# Patient Record
Sex: FEMALE | Race: WHITE | URBAN - METROPOLITAN AREA
[De-identification: names, ages, dates, MRNs, and addresses within clinical notes are randomized per-mention and may not be internally consistent; named-entity substitution may affect disease eponyms.]

---

## 2017-10-04 ENCOUNTER — OFFICE VISIT (OUTPATIENT)
Dept: FAMILY MEDICINE CLINIC | Age: 42
End: 2017-10-04

## 2017-10-04 VITALS
WEIGHT: 173.6 LBS | HEIGHT: 64 IN | HEART RATE: 97 BPM | SYSTOLIC BLOOD PRESSURE: 122 MMHG | RESPIRATION RATE: 14 BRPM | DIASTOLIC BLOOD PRESSURE: 78 MMHG | OXYGEN SATURATION: 97 % | BODY MASS INDEX: 29.64 KG/M2

## 2017-10-04 DIAGNOSIS — Z00.00 ANNUAL PHYSICAL EXAM: Primary | ICD-10-CM

## 2017-10-04 DIAGNOSIS — B00.9 HSV-2 INFECTION: ICD-10-CM

## 2017-10-04 DIAGNOSIS — H53.2 DIPLOPIA: ICD-10-CM

## 2017-10-04 DIAGNOSIS — Z00.00 ANNUAL PHYSICAL EXAM: ICD-10-CM

## 2017-10-04 DIAGNOSIS — H49.11 FOURTH NERVE PALSY OF RIGHT EYE: ICD-10-CM

## 2017-10-04 DIAGNOSIS — I49.8 SINUS ARRHYTHMIA: ICD-10-CM

## 2017-10-04 LAB
A/G RATIO: 1.4 (ref 1.1–2.2)
ALBUMIN SERPL-MCNC: 3.9 G/DL (ref 3.4–5)
ALP BLD-CCNC: 68 U/L (ref 40–129)
ALT SERPL-CCNC: 17 U/L (ref 10–40)
ANION GAP SERPL CALCULATED.3IONS-SCNC: 13 MMOL/L (ref 3–16)
AST SERPL-CCNC: 19 U/L (ref 15–37)
BASOPHILS ABSOLUTE: 0 K/UL (ref 0–0.2)
BASOPHILS RELATIVE PERCENT: 0.2 %
BILIRUB SERPL-MCNC: 0.6 MG/DL (ref 0–1)
BUN BLDV-MCNC: 11 MG/DL (ref 7–20)
CALCIUM SERPL-MCNC: 8.9 MG/DL (ref 8.3–10.6)
CHLORIDE BLD-SCNC: 104 MMOL/L (ref 99–110)
CHOLESTEROL, TOTAL: 207 MG/DL (ref 0–199)
CO2: 24 MMOL/L (ref 21–32)
CREAT SERPL-MCNC: 0.7 MG/DL (ref 0.6–1.1)
EOSINOPHILS ABSOLUTE: 0 K/UL (ref 0–0.6)
EOSINOPHILS RELATIVE PERCENT: 0.7 %
GFR AFRICAN AMERICAN: >60
GFR NON-AFRICAN AMERICAN: >60
GLOBULIN: 2.7 G/DL
GLUCOSE BLD-MCNC: 98 MG/DL (ref 70–99)
HCT VFR BLD CALC: 42.4 % (ref 36–48)
HDLC SERPL-MCNC: 78 MG/DL (ref 40–60)
HEMOGLOBIN: 14.1 G/DL (ref 12–16)
LDL CHOLESTEROL CALCULATED: 121 MG/DL
LYMPHOCYTES ABSOLUTE: 2.4 K/UL (ref 1–5.1)
LYMPHOCYTES RELATIVE PERCENT: 40.8 %
MCH RBC QN AUTO: 30.8 PG (ref 26–34)
MCHC RBC AUTO-ENTMCNC: 33.3 G/DL (ref 31–36)
MCV RBC AUTO: 92.6 FL (ref 80–100)
MONOCYTES ABSOLUTE: 0.4 K/UL (ref 0–1.3)
MONOCYTES RELATIVE PERCENT: 6.3 %
NEUTROPHILS ABSOLUTE: 3.1 K/UL (ref 1.7–7.7)
NEUTROPHILS RELATIVE PERCENT: 52 %
PDW BLD-RTO: 13.2 % (ref 12.4–15.4)
PLATELET # BLD: 220 K/UL (ref 135–450)
PMV BLD AUTO: 8 FL (ref 5–10.5)
POTASSIUM SERPL-SCNC: 4.3 MMOL/L (ref 3.5–5.1)
RBC # BLD: 4.57 M/UL (ref 4–5.2)
SODIUM BLD-SCNC: 141 MMOL/L (ref 136–145)
TOTAL PROTEIN: 6.6 G/DL (ref 6.4–8.2)
TRIGL SERPL-MCNC: 41 MG/DL (ref 0–150)
TSH REFLEX: 2.15 UIU/ML (ref 0.27–4.2)
VLDLC SERPL CALC-MCNC: 8 MG/DL
WBC # BLD: 5.9 K/UL (ref 4–11)

## 2017-10-04 PROCEDURE — 90471 IMMUNIZATION ADMIN: CPT | Performed by: FAMILY MEDICINE

## 2017-10-04 PROCEDURE — 99386 PREV VISIT NEW AGE 40-64: CPT | Performed by: FAMILY MEDICINE

## 2017-10-04 PROCEDURE — 90630 INFLUENZA, QUADV, 18-64 YRS, ID, PF, MICRO INJ, 0.1ML (FLUZONE QUADV, PF): CPT | Performed by: FAMILY MEDICINE

## 2017-10-04 RX ORDER — M-VIT,TX,IRON,MINS/CALC/FOLIC 27MG-0.4MG
1 TABLET ORAL DAILY
COMMUNITY

## 2017-10-04 ASSESSMENT — PATIENT HEALTH QUESTIONNAIRE - PHQ9
1. LITTLE INTEREST OR PLEASURE IN DOING THINGS: 0
2. FEELING DOWN, DEPRESSED OR HOPELESS: 0
SUM OF ALL RESPONSES TO PHQ QUESTIONS 1-9: 0
SUM OF ALL RESPONSES TO PHQ9 QUESTIONS 1 & 2: 0

## 2017-10-04 NOTE — PROGRESS NOTES
Vaccine Information Sheet, \"Influenza - Inactivated\"  given to Sendy Payton, or parent/legal guardian of  Sendy Payton and verbalized understanding. Patient responses:    Have you ever had a reaction to a flu vaccine? No  Are you able to eat eggs without adverse effects? Yes  Do you have any current illness? No  Have you ever had Guillian Holland Patent Syndrome? No    Flu vaccine given per order. Please see immunization tab.
Concern    Not on file     Social History Narrative    No narrative on file       Family History   Problem Relation Age of Onset    High Cholesterol Father     High Blood Pressure Father     Alzheimer's Disease Maternal Aunt     Other Maternal Grandfather      polycystic kidneys    Alzheimer's Disease Paternal Grandmother     Alzheimer's Disease Mother     Stroke Maternal Grandmother        Allergies   Allergen Reactions    Food Anaphylaxis     leida    Poison Ivy Extract Anaphylaxis    Morphine Itching    Naloxone Hives    Sulfa Antibiotics Hives         OBJECTIVE:  /78 (Site: Left Arm, Position: Sitting, Cuff Size: Small Adult)  Pulse 97  Resp 14  Ht 5' 3.5\" (1.613 m)  Wt 173 lb 9.6 oz (78.7 kg)  LMP 10/03/2017 (Exact Date)  SpO2 97%  BMI 30.27 kg/m2  GEN:  Alert, NAD  HEENT:  NCAT, TM/OP nl, PERRL, EOMI. MMM. NECK:  Supple without adenopathy. CV:  Regular rate and rhythm, S1 and S2 normal, no murmurs, clicks, gallops or rubs. No edema. PULM:  Chest is clear, no wheezing or rales. Normal symmetric air entry throughout both lung fields. ABDOMEN: soft, NT, ND, +BS  Neuro: A&O x 3  PSYCH: normal mood and affect. ASSESSMENT:  1. Annual physical exam    2. 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 Diplopia    5. Sinus arrhythmia        PLAN:  1. Reviewed office policies with the patient  2. Prior records reviewed today  3. Labs as ordered - CBC, CMP, lipid panel, TSH  4. Health maintenance reviewed and updated today.